# Patient Record
Sex: FEMALE | Race: ASIAN | NOT HISPANIC OR LATINO | URBAN - METROPOLITAN AREA
[De-identification: names, ages, dates, MRNs, and addresses within clinical notes are randomized per-mention and may not be internally consistent; named-entity substitution may affect disease eponyms.]

---

## 2020-12-05 ENCOUNTER — EMERGENCY (EMERGENCY)
Facility: HOSPITAL | Age: 40
LOS: 1 days | Discharge: HOME | End: 2020-12-05
Attending: EMERGENCY MEDICINE | Admitting: EMERGENCY MEDICINE
Payer: COMMERCIAL

## 2020-12-05 VITALS
TEMPERATURE: 97 F | DIASTOLIC BLOOD PRESSURE: 60 MMHG | SYSTOLIC BLOOD PRESSURE: 120 MMHG | HEART RATE: 80 BPM | RESPIRATION RATE: 18 BRPM | OXYGEN SATURATION: 100 %

## 2020-12-05 VITALS
RESPIRATION RATE: 24 BRPM | DIASTOLIC BLOOD PRESSURE: 79 MMHG | OXYGEN SATURATION: 100 % | HEART RATE: 74 BPM | WEIGHT: 117.07 LBS | SYSTOLIC BLOOD PRESSURE: 121 MMHG | TEMPERATURE: 98 F | HEIGHT: 62 IN

## 2020-12-05 LAB
ALBUMIN SERPL ELPH-MCNC: 4.3 G/DL — SIGNIFICANT CHANGE UP (ref 3.3–5)
ALP SERPL-CCNC: 74 U/L — SIGNIFICANT CHANGE UP (ref 40–120)
ALT FLD-CCNC: 133 U/L — HIGH (ref 10–45)
ANION GAP SERPL CALC-SCNC: 13 MMOL/L — SIGNIFICANT CHANGE UP (ref 5–17)
ANISOCYTOSIS BLD QL: SLIGHT — SIGNIFICANT CHANGE UP
APTT BLD: 32.3 SEC — SIGNIFICANT CHANGE UP (ref 27.5–35.5)
AST SERPL-CCNC: 197 U/L — HIGH (ref 10–40)
BASOPHILS # BLD AUTO: 0 K/UL — SIGNIFICANT CHANGE UP (ref 0–0.2)
BASOPHILS NFR BLD AUTO: 0 % — SIGNIFICANT CHANGE UP (ref 0–2)
BILIRUB SERPL-MCNC: 0.2 MG/DL — SIGNIFICANT CHANGE UP (ref 0.2–1.2)
BUN SERPL-MCNC: 11 MG/DL — SIGNIFICANT CHANGE UP (ref 7–23)
CALCIUM SERPL-MCNC: 9.1 MG/DL — SIGNIFICANT CHANGE UP (ref 8.4–10.5)
CHLORIDE SERPL-SCNC: 97 MMOL/L — SIGNIFICANT CHANGE UP (ref 96–108)
CO2 SERPL-SCNC: 26 MMOL/L — SIGNIFICANT CHANGE UP (ref 22–31)
CREAT SERPL-MCNC: 0.6 MG/DL — SIGNIFICANT CHANGE UP (ref 0.5–1.3)
EOSINOPHIL # BLD AUTO: 0 K/UL — SIGNIFICANT CHANGE UP (ref 0–0.5)
EOSINOPHIL NFR BLD AUTO: 0 % — SIGNIFICANT CHANGE UP (ref 0–6)
GIANT PLATELETS BLD QL SMEAR: PRESENT — SIGNIFICANT CHANGE UP
GLUCOSE SERPL-MCNC: 100 MG/DL — HIGH (ref 70–99)
HCT VFR BLD CALC: 38.4 % — SIGNIFICANT CHANGE UP (ref 34.5–45)
HGB BLD-MCNC: 12.8 G/DL — SIGNIFICANT CHANGE UP (ref 11.5–15.5)
HIV 1+2 AB+HIV1 P24 AG SERPL QL IA: SIGNIFICANT CHANGE UP
INR BLD: 1.02 — SIGNIFICANT CHANGE UP (ref 0.88–1.16)
LACTATE SERPL-SCNC: 1 MMOL/L — SIGNIFICANT CHANGE UP (ref 0.5–2)
LYMPHOCYTES # BLD AUTO: 1.39 K/UL — SIGNIFICANT CHANGE UP (ref 1–3.3)
LYMPHOCYTES # BLD AUTO: 36.8 % — SIGNIFICANT CHANGE UP (ref 13–44)
MACROCYTES BLD QL: SLIGHT — SIGNIFICANT CHANGE UP
MANUAL SMEAR VERIFICATION: SIGNIFICANT CHANGE UP
MCHC RBC-ENTMCNC: 30.2 PG — SIGNIFICANT CHANGE UP (ref 27–34)
MCHC RBC-ENTMCNC: 33.3 GM/DL — SIGNIFICANT CHANGE UP (ref 32–36)
MCV RBC AUTO: 90.6 FL — SIGNIFICANT CHANGE UP (ref 80–100)
MONOCYTES # BLD AUTO: 0.13 K/UL — SIGNIFICANT CHANGE UP (ref 0–0.9)
MONOCYTES NFR BLD AUTO: 3.5 % — SIGNIFICANT CHANGE UP (ref 2–14)
NEUTROPHILS # BLD AUTO: 2.26 K/UL — SIGNIFICANT CHANGE UP (ref 1.8–7.4)
NEUTROPHILS NFR BLD AUTO: 51.8 % — SIGNIFICANT CHANGE UP (ref 43–77)
NEUTS BAND # BLD: 7.9 % — SIGNIFICANT CHANGE UP (ref 0–8)
PLAT MORPH BLD: NORMAL — SIGNIFICANT CHANGE UP
PLATELET # BLD AUTO: 149 K/UL — LOW (ref 150–400)
POLYCHROMASIA BLD QL SMEAR: SLIGHT — SIGNIFICANT CHANGE UP
POTASSIUM SERPL-MCNC: 3.8 MMOL/L — SIGNIFICANT CHANGE UP (ref 3.5–5.3)
POTASSIUM SERPL-SCNC: 3.8 MMOL/L — SIGNIFICANT CHANGE UP (ref 3.5–5.3)
PROT SERPL-MCNC: 7.9 G/DL — SIGNIFICANT CHANGE UP (ref 6–8.3)
PROTHROM AB SERPL-ACNC: 12.2 SEC — SIGNIFICANT CHANGE UP (ref 10.6–13.6)
RBC # BLD: 4.24 M/UL — SIGNIFICANT CHANGE UP (ref 3.8–5.2)
RBC # FLD: 12.1 % — SIGNIFICANT CHANGE UP (ref 10.3–14.5)
RBC BLD AUTO: ABNORMAL
SARS-COV-2 RNA SPEC QL NAA+PROBE: SIGNIFICANT CHANGE UP
SODIUM SERPL-SCNC: 136 MMOL/L — SIGNIFICANT CHANGE UP (ref 135–145)
SPHEROCYTES BLD QL SMEAR: SLIGHT — SIGNIFICANT CHANGE UP
WBC # BLD: 3.79 K/UL — LOW (ref 3.8–10.5)
WBC # FLD AUTO: 3.79 K/UL — LOW (ref 3.8–10.5)

## 2020-12-05 PROCEDURE — 80053 COMPREHEN METABOLIC PANEL: CPT

## 2020-12-05 PROCEDURE — 85730 THROMBOPLASTIN TIME PARTIAL: CPT

## 2020-12-05 PROCEDURE — 36415 COLL VENOUS BLD VENIPUNCTURE: CPT

## 2020-12-05 PROCEDURE — U0003: CPT

## 2020-12-05 PROCEDURE — 87389 HIV-1 AG W/HIV-1&-2 AB AG IA: CPT

## 2020-12-05 PROCEDURE — 85025 COMPLETE CBC W/AUTO DIFF WBC: CPT

## 2020-12-05 PROCEDURE — 83605 ASSAY OF LACTIC ACID: CPT

## 2020-12-05 PROCEDURE — 96374 THER/PROPH/DIAG INJ IV PUSH: CPT

## 2020-12-05 PROCEDURE — 99284 EMERGENCY DEPT VISIT MOD MDM: CPT

## 2020-12-05 PROCEDURE — 99284 EMERGENCY DEPT VISIT MOD MDM: CPT | Mod: 25

## 2020-12-05 PROCEDURE — 85610 PROTHROMBIN TIME: CPT

## 2020-12-05 PROCEDURE — 87040 BLOOD CULTURE FOR BACTERIA: CPT

## 2020-12-05 RX ORDER — SODIUM CHLORIDE 9 MG/ML
1000 INJECTION INTRAMUSCULAR; INTRAVENOUS; SUBCUTANEOUS ONCE
Refills: 0 | Status: COMPLETED | OUTPATIENT
Start: 2020-12-05 | End: 2020-12-05

## 2020-12-05 RX ORDER — KETOROLAC TROMETHAMINE 30 MG/ML
15 SYRINGE (ML) INJECTION ONCE
Refills: 0 | Status: DISCONTINUED | OUTPATIENT
Start: 2020-12-05 | End: 2020-12-05

## 2020-12-05 RX ORDER — METOCLOPRAMIDE HCL 10 MG
10 TABLET ORAL ONCE
Refills: 0 | Status: COMPLETED | OUTPATIENT
Start: 2020-12-05 | End: 2020-12-05

## 2020-12-05 RX ORDER — ACETAMINOPHEN 500 MG
650 TABLET ORAL ONCE
Refills: 0 | Status: COMPLETED | OUTPATIENT
Start: 2020-12-05 | End: 2020-12-05

## 2020-12-05 RX ADMIN — Medication 650 MILLIGRAM(S): at 15:14

## 2020-12-05 RX ADMIN — SODIUM CHLORIDE 1000 MILLILITER(S): 9 INJECTION INTRAMUSCULAR; INTRAVENOUS; SUBCUTANEOUS at 15:18

## 2020-12-05 RX ADMIN — Medication 10 MILLIGRAM(S): at 15:18

## 2020-12-05 NOTE — ED PROVIDER NOTE - OBJECTIVE STATEMENT
Mandarin  [id number]  40F no PMH p/w several complaints. 10d ago pt developed a few small red spots on her b/l LE. 6d ago pt developed fevers which has since resolved. Also w/ R temporal HA, x1w, intermittent. Also w/ occasional NBNB. Also intermittent abd pain, none currently. Also very minimal sore throat, resolved. Minimal diarrhea. Denies recent travel, sick contacts, tinnitus, vertiginous symptoms, SOB, CP, rhinorrhea, cough, congestion, urinary complaints, black/bloody stool, focal weakness/numbness, vision changes, neck/back pain, LE pain/swelling. Denies SI/HI, toxic ingestions.   NP Ma [phone]: 4d ago pt had p/w fevers and ulcer. Had labs drawn was started doxycycline. Labs resulted today and WBC 2.59, platelets 120. NP called pt and pt was still c/o ulcer and HA so referred to ED.

## 2020-12-05 NOTE — ED PROVIDER NOTE - PATIENT PORTAL LINK FT
You can access the FollowMyHealth Patient Portal offered by [facility] by registering at the following website: [email or web address]. By joining "[employer]"’s FollowMyHealth portal, you will also be able to view your health information using other applications (apps) compatible with our system.

## 2020-12-05 NOTE — ED ADULT NURSE NOTE - CHIEF COMPLAINT QUOTE
per  Alee #[id number], patient c/o headaches and vomiting since Sunday night. Patient also reports fever off 100.6 on Thursday. Reports she was seen at clinic on Thursday and had low WBC and platelets on lab work. Patient also reports wound to right calf.

## 2020-12-05 NOTE — ED PROVIDER NOTE - NSFOLLOWUPINSTRUCTIONS_ED_ALL_ED_FT
Your COVID results are pending, can call ER at [phone] to get results. Can take a few hours to a few days to result. If you are "positive," you will get a phone call.     Can take tylenol 650mg every 6hrs as needed for pain.  Can also take motrin 600mg every 6hrs as needed for pain (WARNING: Use may cause stomach issues/problems. Take with food. Prolonged use can also cause kidney issues.).   Warm soaks twice a day.  Take antibiotics as prescribed.   Take daily pictures of affected area.  Stay well hydrated.  Return for fevers, persistent vomit, uncontrolled pain, worsening breathing, worsening lightheaded, spreading redness, worsening swelling.  Follow up with primary doctor within 1-2 days. You have several lab abnormalities that your doctor should keep track of.   Follow up with dermatologist. Can call [phone] OR can visit [email or web address] to schedule appointment.     Cellulitis    Cellulitis is a skin infection caused by bacteria. This condition occurs most often in the arms and lower legs but can occur anywhere over the body. Symptoms include redness, swelling, warm skin, tenderness, and chills/fever. If you were prescribed an antibiotic medicine, take it as told by your health care provider. Do not stop taking the antibiotic even if you start to feel better.    SEEK IMMEDIATE MEDICAL CARE IF YOU HAVE ANY OF THE FOLLOWING SYMPTOMS: worsening fever, red streaks coming from affected area, vomiting or diarrhea, or dizziness/lightheadedness.

## 2020-12-05 NOTE — ED PROVIDER NOTE - CARE PLAN
Principal Discharge DX:	Cellulitis  Secondary Diagnosis:	Leukopenia  Secondary Diagnosis:	Transaminitis

## 2020-12-05 NOTE — ED PROVIDER NOTE - PHYSICAL EXAMINATION
minimal scattered macules L shin region, and face, ~1cm ulcer on R shin w/ minimal surrounding erythema/warmth. no petechiae. No crepitus, firmness, induration, fluctuance. Skin is normal temp.  No spinal ttp, neck FROM. Strength 5/5. No bony ttp, FROM all extremities. Normal equal distal pulses. Steady unassisted gait. no nuchal rigidity.

## 2020-12-05 NOTE — ED PROVIDER NOTE - PROGRESS NOTE DETAILS
Klep[place]: very mild leukopenia and thrombocytopenia. mild transaminitis. other labs grossly wnl. covid pending. Pt feeling much better.  [id number]. Discussed all results with patient, including any incidental radiological findings and lab abnormalities. Given copy of results and instructed to bring copy to primary doctor.   Clinically no indication for further emergent ED workup or hospitalization at this time. Comfortable for dc, outpt f/u.

## 2020-12-05 NOTE — ED ADULT NURSE NOTE - OBJECTIVE STATEMENT
39 y/o female came in to ED for evaluation of abdominal pain, nausea and vomiting. Pt also with some lesions noted on LE.

## 2020-12-09 DIAGNOSIS — R74.01 ELEVATION OF LEVELS OF LIVER TRANSAMINASE LEVELS: ICD-10-CM

## 2020-12-09 DIAGNOSIS — D72.819 DECREASED WHITE BLOOD CELL COUNT, UNSPECIFIED: ICD-10-CM

## 2020-12-09 DIAGNOSIS — L03.115 CELLULITIS OF RIGHT LOWER LIMB: ICD-10-CM

## 2020-12-09 DIAGNOSIS — Z20.828 CONTACT WITH AND (SUSPECTED) EXPOSURE TO OTHER VIRAL COMMUNICABLE DISEASES: ICD-10-CM

## 2020-12-09 DIAGNOSIS — R11.0 NAUSEA: ICD-10-CM

## 2020-12-10 LAB
CULTURE RESULTS: SIGNIFICANT CHANGE UP
CULTURE RESULTS: SIGNIFICANT CHANGE UP
SPECIMEN SOURCE: SIGNIFICANT CHANGE UP
SPECIMEN SOURCE: SIGNIFICANT CHANGE UP

## 2021-03-12 NOTE — ED PROVIDER NOTE - NSSUBSTANCEUSE_GEN_ALL_CORE_SD
Problem: At Risk for Falls  Goal: # Takes action to control fall-related risks  Outcome: Outcome Met, Continue evaluating goal progress toward completion     Problem: Pain  Goal: # Acceptable pain level achieved/maintained with activity using NRS/Faces  Description: This goal is used for patients who can self-report and are not achieving acceptable pain control during activity.  Outcome: Outcome Met, Continue evaluating goal progress toward completion      never used

## 2022-06-27 NOTE — ED PROVIDER NOTE - MDM ORDERS SUBMITTED SELECTION
-patient restless and sometimes makes confusing statements per daughter  -suspect combination of delirium from prolonged hospitalization, uncontrolled pain, narcotics, post operative delirium  -CTH unremarkable on 6/8  -needs mobilization/PT/redirection Labs

## 2024-04-30 NOTE — ED ADULT TRIAGE NOTE - CHIEF COMPLAINT QUOTE
per  Alee #[id number], patient c/o headaches and vomiting since Sunday night. Patient also reports fever off 100.6 on Thursday. Reports she was seen at clinic on Thursday and had low WBC and platelets on lab work. Patient also reports wound to right calf.
Walking

## 2024-10-02 NOTE — ED ADULT TRIAGE NOTE - LANGUAGE ASSISTANCE NEEDED
Yes-Patient/Caregiver accepts free interpretation services... 1. Admit to BMTU on day -1    2. TLC placement in IR    3. Melphalan hydration: start NS at 250ml / hr for 2 hours before and 2 hours post melphalan infusion    4. On day -1;  melphalan 200mg/m2 IV once administered over 30 minutes; initiate cryotherapy (one tbsp of ice in the mouth constantly) 15 minutes before, during and following the melphalan infusion    5. Stem cell infusion on day 0    6. Start zarxio 5mcg/kg/day daily on day + 5.
